# Patient Record
Sex: FEMALE
[De-identification: names, ages, dates, MRNs, and addresses within clinical notes are randomized per-mention and may not be internally consistent; named-entity substitution may affect disease eponyms.]

---

## 2019-02-24 ENCOUNTER — HOSPITAL ENCOUNTER (EMERGENCY)
Dept: HOSPITAL 5 - ED | Age: 39
Discharge: HOME | End: 2019-02-24
Payer: COMMERCIAL

## 2019-02-24 VITALS — SYSTOLIC BLOOD PRESSURE: 144 MMHG | DIASTOLIC BLOOD PRESSURE: 92 MMHG
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
## 2019-02-24 DIAGNOSIS — F17.200: ICD-10-CM

## 2019-02-24 DIAGNOSIS — Z88.0: ICD-10-CM

## 2019-02-24 DIAGNOSIS — S92.414A: Primary | ICD-10-CM
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
## 2019-02-24 NOTE — XRAY REPORT
FINAL REPORT



EXAM:  XR FOOT 3+V RT



HISTORY:  right foot pain 



TECHNIQUE:  Frontal, lateral, oblique views right foot



Comparison: None 



FINDINGS:  

There is a angulated and displaced oblique fracture through the diaphysis of the proximal phalanx of 
the great toe.



There is associated soft tissue swelling.



The bony structures are otherwise unremarkable.



IMPRESSION:  

1. Angulated and displaced oblique fracture through the diaphysis of the proximal phalanx of the grea
t toe right foot.

## 2019-02-24 NOTE — EMERGENCY DEPARTMENT REPORT
Blank Doc





- Documentation


Documentation: 





This is a 38-year-old female that presents with right foot pain and swelling.   

Stated a heavy item landed on foot.  Denies any other complaints or symptoms.  





This initial assessment diagnostic orders/clinical plan/treatment(s) is/are 

subject to change based on patient's health status, clinical progression and re-

assessment by fellow clinical providers in the ED.  Further treatment and workup

at subsequent clinical providers discretion.  Patient/guardians urged not to david

pe from ED s their condition may be serious if not clinically assessed and 

managed.  Initial orders include:


1-Patient sent to [facility] for further evaluation and treatment


2- xray

## 2019-02-24 NOTE — EMERGENCY DEPARTMENT REPORT
ED Lower Extremity HPI





- General


Chief Complaint: Extremity Injury, Lower


Stated Complaint: POSS (R) BROKEN TOE/FOOT


Time Seen by Provider: 02/24/19 17:07


Source: patient


Mode of arrival: Ambulatory


Limitations: No Limitations





- History of Present Illness


Initial Comments: 





dropped heavy object on R great toe this AM


pain with walking


MD Complaint: foot injury


-: Sudden, This morning


Injury: Toes: Right


Type of Injury: blunt


Place: home


Severity: severe


Severity scale (0 -10): 8


Improves With: nothing


Worsens With: weight bearing


Context: direct blow


Associated Symptoms: swelling, able to partially bear weight





- Related Data


                                  Previous Rx's











 Medication  Instructions  Recorded  Last Taken  Type


 


HYDROcodone/APAP 5-325 [[place] 1 each PO Q4HR PRN #15 tablet 02/24/19 Unknown Rx





5/325]    











                                    Allergies











Allergy/AdvReac Type Severity Reaction Status Date / Time


 


Penicillins Allergy  Swelling Verified 02/24/19 16:52














ED Review of Systems


ROS: 


Stated complaint: POSS (R) BROKEN TOE/FOOT


Other details as noted in HPI





Comment: All other systems reviewed and negative


Musculoskeletal: as per HPI





ED Past Medical Hx





- Past Medical History


Previous Medical History?: No





- Surgical History


Past Surgical History?: Yes


Additional Surgical History: tonisllectomy.  tubal ligation





- Social History


Smoking Status: Current Every Day Smoker


Substance Use Type: None





- Medications


Home Medications: 


                                Home Medications











 Medication  Instructions  Recorded  Confirmed  Last Taken  Type


 


HYDROcodone/APAP 5-325 [[place] 1 each PO Q4HR PRN #15 tablet 02/24/19  Unknown Rx





5/325]     














ED Physical Exam





- General


Limitations: No Limitations


General appearance: alert, in no apparent distress





- Head


Head exam: Present: atraumatic, normocephalic





- Eye


Eye exam: Present: normal appearance





- ENT


ENT exam: Present: mucous membranes moist





- Neck


Neck exam: Present: normal inspection





- Respiratory


Respiratory exam: Present: normal lung sounds bilaterally.  Absent: respiratory 

distress





- Cardiovascular


Cardiovascular Exam: Present: regular rate, normal rhythm.  Absent: systolic 

murmur, diastolic murmur, rubs, gallop





- GI/Abdominal


GI/Abdominal exam: Present: soft, normal bowel sounds





- Extremities Exam


Extremities exam: Present: other (R great toe with mild swelling, ecchymosis, 

tenderness over proximal phalanx. cap refill normal )





- Back Exam


Back exam: Present: normal inspection





- Neurological Exam


Neurological exam: Present: alert, oriented X3





- Psychiatric


Psychiatric exam: Present: normal affect, normal mood





- Skin


Skin exam: Present: warm, dry, intact, normal color.  Absent: rash





ED Course


                                   Vital Signs











  02/24/19 02/24/19





  16:56 17:47


 


Temperature 97.7 F 


 


Pulse Rate 95 H 


 


Respiratory 18 16





Rate  


 


Blood Pressure 144/92 


 


O2 Sat by Pulse 96 





Oximetry  














ED Lower Extremity MDM





- Radiology Data


Radiology results: image reviewed


interpreted by me: 





Fracture of the right great toe proximal phalanx. 





- Medical Decision Making





great toe fracture


splint, nonweightbearing


fu podiatry


given norco here





- Differential Diagnosis


fx, contusion


Critical care attestation.: 


If time is entered above; I have spent that time in minutes in the direct care 

of this critically ill patient, excluding procedure time.








ED Disposition


Clinical Impression: 


Fracture of great toe of right foot


Qualifiers:


 Encounter type: initial encounter Fracture type: closed Phalanx: proximal 

Fracture alignment: nondisplaced Qualified Code(s): S92.414A - Nondisplaced 

fracture of proximal phalanx of right great toe, initial encounter for closed 

fracture





Disposition: DC-01 TO HOME OR SELFCARE


Is pt being admited?: No


Condition: Good


Instructions:  Toe Fracture (ED)


Prescriptions: 


HYDROcodone/APAP 5-325 [[place] 5/325] 1 each PO Q4HR PRN #15 tablet


 PRN Reason: Pain


Referrals: 


VÍCTOR AGUAYO DPM [Staff Physician] - 3-5 Days


Time of Disposition: 17:54